# Patient Record
Sex: FEMALE | Race: WHITE | Employment: UNEMPLOYED | URBAN - METROPOLITAN AREA
[De-identification: names, ages, dates, MRNs, and addresses within clinical notes are randomized per-mention and may not be internally consistent; named-entity substitution may affect disease eponyms.]

---

## 2018-04-11 ENCOUNTER — OFFICE VISIT (OUTPATIENT)
Dept: SURGERY | Age: 38
End: 2018-04-11

## 2018-04-11 VITALS
SYSTOLIC BLOOD PRESSURE: 112 MMHG | HEART RATE: 101 BPM | OXYGEN SATURATION: 96 % | DIASTOLIC BLOOD PRESSURE: 73 MMHG | WEIGHT: 250 LBS | HEIGHT: 65 IN | RESPIRATION RATE: 20 BRPM | BODY MASS INDEX: 41.65 KG/M2 | TEMPERATURE: 98.1 F

## 2018-04-11 DIAGNOSIS — D50.9 IRON DEFICIENCY ANEMIA, UNSPECIFIED IRON DEFICIENCY ANEMIA TYPE: ICD-10-CM

## 2018-04-11 DIAGNOSIS — E55.9 VITAMIN D DEFICIENCY: ICD-10-CM

## 2018-04-11 DIAGNOSIS — E53.8 VITAMIN B12 DEFICIENCY: ICD-10-CM

## 2018-04-11 DIAGNOSIS — R63.5 WEIGHT GAIN: ICD-10-CM

## 2018-04-11 DIAGNOSIS — K90.9 INTESTINAL MALABSORPTION, UNSPECIFIED TYPE: ICD-10-CM

## 2018-04-11 DIAGNOSIS — Z98.84 STATUS POST GASTRIC BYPASS FOR OBESITY: ICD-10-CM

## 2018-04-11 DIAGNOSIS — E66.01 MORBID OBESITY (HCC): Primary | ICD-10-CM

## 2018-04-11 RX ORDER — BUPROPION HYDROCHLORIDE 150 MG/1
TABLET, EXTENDED RELEASE ORAL
Refills: 2 | COMMUNITY
Start: 2018-04-05

## 2018-04-11 RX ORDER — TRAZODONE HYDROCHLORIDE 50 MG/1
TABLET ORAL
Refills: 6 | COMMUNITY
Start: 2018-03-24

## 2018-04-11 RX ORDER — ALPRAZOLAM 0.5 MG/1
TABLET ORAL
Refills: 0 | COMMUNITY
Start: 2018-04-04

## 2018-04-11 RX ORDER — CITALOPRAM 40 MG/1
TABLET, FILM COATED ORAL
Refills: 6 | COMMUNITY
Start: 2018-03-26

## 2018-04-11 NOTE — PATIENT INSTRUCTIONS
Upper GI Series: About This Test  What is it? An upper gastrointestinal (GI) series looks at the upper and middle sections of the gastrointestinal tract. The test uses barium contrast material, fluoroscopy, and X-ray. Fluoroscopy is a kind of X-ray. Why is this test done? An upper GI series is done to:  · Find the cause of gastrointestinal symptoms, such as vomiting, burping up food, trouble swallowing, or belly pain. · Find inflamed areas of the intestine. · Find narrow spots (strictures) in the upper intestinal tract or find ulcers, tumors, polyps, or pyloric stenosis. · Find swallowed objects. How can you prepare for the test?  Tell your doctor if:  · You are taking any medicine. · You are allergic to any medicines, barium, or any other X-ray contrast material.  · You are or might be pregnant. This test is not done during pregnancy because of the risk of radiation to the baby (fetus). Your doctor may ask you to do one or all of the following:  · Eat a low-fiber diet for a few days before the test.  · Stop eating for 12 hours before the test.  · Take a laxative to help clean out your intestines the evening before the test.  · Stop taking certain medicines. What happens before the test?  The test is usually done in a clinic or the X-ray department of a hospital.  · You will need to take off your clothes and put on a hospital gown. · Take out any dentures, and take off any jewelry. What happens during the test?  · You will lie on your back on an X-ray table. · You will have an X-ray taken before you drink the barium mix. Then you'll take small swallows repeatedly during the series of X-rays that follow. · The doctor watches the barium pass through your GI tract using fluoroscopy and X-ray pictures. The table is tilted at different positions, and you may change positions to help spread the barium.   What else should you know about the test?  · You may be given a laxative or enema to flush the barium out of your intestines after the test. This prevents constipation. · It's a good idea to drink a lot of fluids for a few days to flush out the barium. · For 1 to 3 days after the test, your stool will look white from the barium. How long does the test take? · The test will take about 30 to 40 minutes. If you are also having a small bowel study, the test will take 2 to 6 hours. In some cases, you may be asked to come back after 24 hours to have more X-rays taken. What happens after the test?  · You will probably be able to go home right away. Results of the test are usually ready in 1 to 3 days. · You can go back to your usual activities right away. You may eat and drink whatever you like, unless your doctor tells you not to. When should you call for help? Watch closely for changes in your health, and be sure to contact your doctor if:  · You aren't able to have a bowel movement in 2 to 3 days after the test.  Follow-up care is a key part of your treatment and safety. Be sure to make and go to all appointments, and call your doctor if you are having problems. It's also a good idea to keep a list of the medicines you take. Ask your doctor when you can expect to have your test results. Where can you learn more? Go to http://ana-mikey.info/. Enter H473 in the search box to learn more about \"Upper GI Series: About This Test.\"  Current as of: October 14, 2016  Content Version: 11.4  © 5965-9428 Healthwise, Incorporated. Care instructions adapted under license by [employer] (which disclaims liability or warranty for this information). If you have questions about a medical condition or this instruction, always ask your healthcare professional. [address] any warranty or liability for your use of this information.
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
## 2018-04-11 NOTE — PROGRESS NOTES
1. Have you been to the ER, urgent care clinic since your last visit? Hospitalized since your last visit? No    2. Have you seen or consulted any other health care providers outside of the [address] [place] [address] since your last visit? Include any pap smears or colon screening.  No

## 2018-04-11 NOTE — MR AVS SNAPSHOT
[address] [address] 
[phone] Patient: Ileana Bey MRN: [id number] DAMIAN:[birth year] Visit Information Date & Time Provider Department Dept. Phone Encounter #  
 4/11/2018  3:00 PM Daisy Austin NP [facility] 22 [phone] [id number] Upcoming Health Maintenance Date Due DTaP/Tdap/Td series (1 - Tdap) 9/9/2000 PAP AKA CERVICAL CYTOLOGY 9/9/2000 Influenza Age 5 to Adult 8/1/2017 Allergies as of 4/11/2018  Review Complete On: 4/11/2018 By: Thor South Severity Noted Reaction Type Reactions Codeine  10/16/2010    Itching  
 facial  
  
Current Immunizations  Never Reviewed No immunizations on file. Not reviewed this visit You Were Diagnosed With   
  
 Codes Comments [facility]    -  Primary ICD-10-CM: E66.01 
ICD-9-CM: 278.01 Status post gastric bypass for obesity     ICD-10-CM: Z98.84 ICD-9-CM: V45.86 Intestinal malabsorption, unspecified type     ICD-10-CM: K90.9 ICD-9-CM: 579.9 Weight gain     ICD-10-CM: R63.5 ICD-9-CM: 783.1 Vitamin B12 deficiency     ICD-10-CM: E53.8 ICD-9-CM: 266.2 Iron deficiency anemia, unspecified iron deficiency anemia type     ICD-10-CM: D50.9 ICD-9-CM: 280.9 Vitamin D deficiency     ICD-10-CM: E55.9 ICD-9-CM: 268.9 BMI 40.0-44.9, adult [facility])     ICD-10-CM: Z68.41 
ICD-9-CM: V85.41 Vitals BP Pulse Temp Resp Height(growth percentile) Weight(growth percentile) 112/73 (BP 1 Location: Left arm, BP Patient Position: Sitting) (!) 101 98.1 °F (36.7 °C) (Oral) 20 5' 4.5\" (1.638 m) 250 lb (113.4 kg) SpO2 BMI Smoking Status 96% 42.25 kg/m2 Never Smoker Vitals History BMI and BSA Data Body Mass Index Body Surface Area  
 42.25 kg/m 2 2.27 m 2 Preferred Pharmacy Pharmacy Name Phone [id number] [address] [phone] Your Updated Medication List  
  
   
This list is accurate as of 4/11/18  3:56 PM.  Always use your most recent med list.  
  
  
  
  
 ALPRAZolam 0.5 mg tablet Commonly known as:  XANAX  
TAKE 1 TABLET BY MOUTH AT BEDTIME. ***MUST BE SEEN*** AMBIEN PO Take  by mouth as needed. ATIVAN PO Take  by mouth as needed. B-12 DOTS 500 mcg tablet Generic drug:  cyanocobalamin Take 500 mcg by mouth daily. buPROPion  mg SR tablet Commonly known as:  WELLBUTRIN SR  
TAKE 1 TABLET BY MOUTH TWICE A DAY  
  
 CALCIUM 500 PO Take 500 mg by mouth two (2) times a day. citalopram 40 mg tablet Commonly known as:  CELEXA  
TAKE 1 TABLET BY MOUTH EVERY DAY  
  
 JAYJAY-IRON PO Take  by mouth.  
  
 multivitamin with iron tablet Take  by mouth.  
  
 polyethylene glycol 17 gram packet Commonly known as:  [place] Take 1 Packet by mouth daily. traZODone 50 mg tablet Commonly known as:  DESYREL  
TAKE 1 TABLET BY MOUTH AT BEDTIME AS NEEDED FOR SLEEP  
  
 VITAMIN D2 PO Take  by mouth. We Performed the Following CBC W/O DIFF [54014 CPT(R)] IRON PROFILE [id number] CPT(R)] METABOLIC PANEL, COMPREHENSIVE [31130 CPT(R)] PTH INTACT [94809 CPT(R)] REFERRAL TO GASTROENTEROLOGY [UVT36 Custom] Comments:  
 Status post gastric bypass, weight gain VITAMIN B12 & FOLATE [30493 CPT(R)] VITAMIN D, 25 HYDROXY [id number] CPT(R)] To-Do List   
 04/11/2018 Imaging:  XR UPPER GI SERIES W KUB Referral Information Referral ID Referred By Referred To  
  
 [id number] Noemi Strong Not Available Visits Status Start Date End Date 1 New Request 4/11/18 4/11/19 If your referral has a status of pending review or denied, additional information will be sent to support the outcome of this decision. Patient Instructions Upper GI Series: About This Test 
What is it? An upper gastrointestinal (GI) series looks at the upper and middle sections of the gastrointestinal tract. The test uses barium contrast material, fluoroscopy, and X-ray. Fluoroscopy is a kind of X-ray. Why is this test done? An upper GI series is done to: · Find the cause of gastrointestinal symptoms, such as vomiting, burping up food, trouble swallowing, or belly pain. · Find inflamed areas of the intestine. · Find narrow spots (strictures) in the upper intestinal tract or find ulcers, tumors, polyps, or pyloric stenosis. · Find swallowed objects. How can you prepare for the test? 
Tell your doctor if: 
· You are taking any medicine. · You are allergic to any medicines, barium, or any other X-ray contrast material. 
· You are or might be pregnant. This test is not done during pregnancy because of the risk of radiation to the baby (fetus). Your doctor may ask you to do one or all of the following: 
· Eat a low-fiber diet for a few days before the test. 
· Stop eating for 12 hours before the test. 
· Take a laxative to help clean out your intestines the evening before the test. 
· Stop taking certain medicines. What happens before the test? 
The test is usually done in a clinic or the X-ray department of a hospital. 
· You will need to take off your clothes and put on a hospital gown. · Take out any dentures, and take off any jewelry. What happens during the test? 
· You will lie on your back on an X-ray table. · You will have an X-ray taken before you drink the barium mix. Then you'll take small swallows repeatedly during the series of X-rays that follow. · The doctor watches the barium pass through your GI tract using fluoroscopy and X-ray pictures. The table is tilted at different positions, and you may change positions to help spread the barium.  
What else should you know about the test? 
 · You may be given a laxative or enema to flush the barium out of your intestines after the test. This prevents constipation. · It's a good idea to drink a lot of fluids for a few days to flush out the barium. · For 1 to 3 days after the test, your stool will look white from the barium. How long does the test take? · The test will take about 30 to 40 minutes. If you are also having a small bowel study, the test will take 2 to 6 hours. In some cases, you may be asked to come back after 24 hours to have more X-rays taken. What happens after the test? 
· You will probably be able to go home right away. Results of the test are usually ready in 1 to 3 days. · You can go back to your usual activities right away. You may eat and drink whatever you like, unless your doctor tells you not to. When should you call for help? Watch closely for changes in your health, and be sure to contact your doctor if: 
· You aren't able to have a bowel movement in 2 to 3 days after the test. 
Follow-up care is a key part of your treatment and safety. Be sure to make and go to all appointments, and call your doctor if you are having problems. It's also a good idea to keep a list of the medicines you take. Ask your doctor when you can expect to have your test results. Where can you learn more? Go to http://ana-mikey.info/. Enter J416 in the search box to learn more about \"Upper GI Series: About This Test.\" Current as of: October 14, 2016 Content Version: 11.4 © 7053-0764 Healthwise, Incorporated. Care instructions adapted under license by [employer] (which disclaims liability or warranty for this information). If you have questions about a medical condition or this instruction, always ask your healthcare professional. [place] [address] any warranty or liability for your use of this information. Introducing [facility] SERVICES! [facility] introduces SynapSense patient portal. Now you can access parts of your medical record, email your doctor's office, and request medication refills online. 1. In your internet browser, go to [email or web address] 2. Click on the First Time User? Click Here link in the Sign In box. You will see the New Member Sign Up page. 3. Enter your SynapSense Access Code exactly as it appears below. You will not need to use this code after youve completed the sign-up process. If you do not sign up before the expiration date, you must request a new code. · SynapSense Access Code: [email or web address] Expires: 7/10/2018  3:54 PM 
 
4. Enter the last four digits of your Social Security Number (xxxx) and Date of Birth (mm/dd/yyyy) as indicated and click Submit. You will be taken to the next sign-up page. 5. Create a SynapSense ID. This will be your SynapSense login ID and cannot be changed, so think of one that is secure and easy to remember. 6. Create a SynapSense password. You can change your password at any time. 7. Enter your Password Reset Question and Answer. This can be used at a later time if you forget your password. 8. Enter your e-mail address. You will receive e-mail notification when new information is available in [address] 9. Click Sign Up. You can now view and download portions of your medical record. 10. Click the Download Summary menu link to download a portable copy of your medical information. If you have questions, please visit the Frequently Asked Questions section of the SynapSense website. Remember, SynapSense is NOT to be used for urgent needs. For medical emergencies, dial 911. Now available from your iPhone and Android! Please provide this summary of care documentation to your next provider. Your primary care clinician is listed as Jame Montes De Oca III. If you have any questions after today's visit, please call [phone].

## 2018-04-12 LAB
25(OH)D3+25(OH)D2 SERPL-MCNC: 30.7 NG/ML (ref 30–100)
ALBUMIN SERPL-MCNC: 4.3 G/DL (ref 3.5–5.5)
ALBUMIN/GLOB SERPL: 1.7 {RATIO} (ref 1.2–2.2)
ALP SERPL-CCNC: 98 IU/L (ref 39–117)
ALT SERPL-CCNC: 16 IU/L (ref 0–32)
AST SERPL-CCNC: 19 IU/L (ref 0–40)
BILIRUB SERPL-MCNC: 0.3 MG/DL (ref 0–1.2)
BUN SERPL-MCNC: 8 MG/DL (ref 6–20)
BUN/CREAT SERPL: 11 (ref 9–23)
CALCIUM SERPL-MCNC: 8.9 MG/DL (ref 8.7–10.2)
CHLORIDE SERPL-SCNC: 104 MMOL/L (ref 96–106)
CO2 SERPL-SCNC: 22 MMOL/L (ref 18–29)
CREAT SERPL-MCNC: 0.71 MG/DL (ref 0.57–1)
ERYTHROCYTE [DISTWIDTH] IN BLOOD BY AUTOMATED COUNT: 14.9 % (ref 12.3–15.4)
FOLATE SERPL-MCNC: 3.9 NG/ML
GFR SERPLBLD CREATININE-BSD FMLA CKD-EPI: 108 ML/MIN/1.73
GFR SERPLBLD CREATININE-BSD FMLA CKD-EPI: 125 ML/MIN/1.73
GLOBULIN SER CALC-MCNC: 2.6 G/DL (ref 1.5–4.5)
GLUCOSE SERPL-MCNC: 81 MG/DL (ref 65–99)
HCT VFR BLD AUTO: 38 % (ref 34–46.6)
HGB BLD-MCNC: 12.5 G/DL (ref 11.1–15.9)
IRON SATN MFR SERPL: 13 % (ref 15–55)
IRON SERPL-MCNC: 48 UG/DL (ref 27–159)
MCH RBC QN AUTO: 28 PG (ref 26.6–33)
MCHC RBC AUTO-ENTMCNC: 32.9 G/DL (ref 31.5–35.7)
MCV RBC AUTO: 85 FL (ref 79–97)
PLATELET # BLD AUTO: 304 X10E3/UL (ref 150–379)
POTASSIUM SERPL-SCNC: 4.5 MMOL/L (ref 3.5–5.2)
PROT SERPL-MCNC: 6.9 G/DL (ref 6–8.5)
PTH-INTACT SERPL-MCNC: 22 PG/ML (ref 15–65)
RBC # BLD AUTO: 4.47 X10E6/UL (ref 3.77–5.28)
SODIUM SERPL-SCNC: 143 MMOL/L (ref 134–144)
TIBC SERPL-MCNC: 379 UG/DL (ref 250–450)
UIBC SERPL-MCNC: 331 UG/DL (ref 131–425)
VIT B12 SERPL-MCNC: 463 PG/ML (ref 232–1245)
WBC # BLD AUTO: 8.1 X10E3/UL (ref 3.4–10.8)

## 2018-08-27 ENCOUNTER — HOSPITAL ENCOUNTER (OUTPATIENT)
Dept: GENERAL RADIOLOGY | Age: 38
Discharge: HOME | End: 2018-08-27
Attending: NURSE PRACTITIONER
Payer: MEDICAID

## 2018-08-27 DIAGNOSIS — D50.9 IRON DEFICIENCY ANEMIA, UNSPECIFIED IRON DEFICIENCY ANEMIA TYPE: ICD-10-CM

## 2018-08-27 DIAGNOSIS — E66.01 MORBID OBESITY (HCC): ICD-10-CM

## 2018-08-27 DIAGNOSIS — E55.9 VITAMIN D DEFICIENCY: ICD-10-CM

## 2018-08-27 DIAGNOSIS — R63.5 WEIGHT GAIN: ICD-10-CM

## 2018-08-27 DIAGNOSIS — K90.9 INTESTINAL MALABSORPTION, UNSPECIFIED TYPE: ICD-10-CM

## 2018-08-27 DIAGNOSIS — E53.8 VITAMIN B12 DEFICIENCY: ICD-10-CM

## 2018-08-27 DIAGNOSIS — Z98.84 STATUS POST GASTRIC BYPASS FOR OBESITY: ICD-10-CM

## 2018-08-27 PROCEDURE — 74241 XR UPPER GI SERIES W KUB: CPT

## 2018-08-28 ENCOUNTER — TELEPHONE (OUTPATIENT)
Dept: SURGERY | Age: 38
End: 2018-08-28

## 2018-08-28 NOTE — TELEPHONE ENCOUNTER
Telephone call with patient. Patient was seen in April 2018 for weight gain. Patient wanting results of Upper GI. Patient desire to have revision of gastric bypass. Upper GI: The pouch measures 4.5 x 9.5 cm. Patient was only able to ingest 4 ounces. There was delayed passage of a barium tablet through the gastrojejunal anastomosis. Advised patient will schedule for endoscopy for further evaluation. Patient to also have appointment with surgeon. Advised if any questions to call the office.

## 2018-09-05 ENCOUNTER — TELEPHONE (OUTPATIENT)
Dept: SURGERY | Age: 38
End: 2018-09-05

## 2018-09-06 ENCOUNTER — TELEPHONE (OUTPATIENT)
Dept: SURGERY | Age: 38
End: 2018-09-06

## 2018-09-06 NOTE — TELEPHONE ENCOUNTER
Telephone call with patient. Patient requesting for documentation to be sent to endoscopy group at [facility] for to have endoscopy scheduled. Stated the GI group that we previous sent information to didn't take her insurance. Advised patient will send office note and demographic information. Information faxed. Advised if any questions or concerns to call the office.

## 2018-09-18 ENCOUNTER — TELEPHONE (OUTPATIENT)
Dept: SURGERY | Age: 38
End: 2018-09-18

## 2018-11-14 ENCOUNTER — ANESTHESIA EVENT (OUTPATIENT)
Dept: ENDOSCOPY | Age: 38
End: 2018-11-14
Payer: SELF-PAY

## 2018-11-14 NOTE — ANESTHESIA PREPROCEDURE EVALUATION
Anesthetic History No history of anesthetic complications Review of Systems / Medical History Patient summary reviewed, nursing notes reviewed and pertinent labs reviewed Pulmonary Sleep apnea Neuro/Psych Headaches and psychiatric history Cardiovascular Within defined limits GI/Hepatic/Renal 
  
GERD Endo/Other Obesity Other Findings Physical Exam 
 
Airway Mallampati: II 
TM Distance: > 6 cm Neck ROM: normal range of motion Mouth opening: Normal 
 
 Cardiovascular Regular rate and rhythm,  S1 and S2 normal,  no murmur, click, rub, or gallop Dental 
No notable dental hx Pulmonary Breath sounds clear to auscultation Abdominal 
GI exam deferred Other Findings Anesthetic Plan ASA: 3 Anesthesia type: MAC Induction: Intravenous Anesthetic plan and risks discussed with: Patient

## 2018-11-15 ENCOUNTER — ANESTHESIA (OUTPATIENT)
Dept: ENDOSCOPY | Age: 38
End: 2018-11-15
Payer: SELF-PAY

## 2018-11-15 ENCOUNTER — HOSPITAL ENCOUNTER (OUTPATIENT)
Age: 38
Setting detail: OUTPATIENT SURGERY
Discharge: HOME | End: 2018-11-15
Attending: SPECIALIST | Admitting: SPECIALIST
Payer: SELF-PAY

## 2018-11-15 VITALS
RESPIRATION RATE: 12 BRPM | TEMPERATURE: 97.7 F | SYSTOLIC BLOOD PRESSURE: 130 MMHG | HEIGHT: 64 IN | DIASTOLIC BLOOD PRESSURE: 76 MMHG | WEIGHT: 237 LBS | BODY MASS INDEX: 40.46 KG/M2 | OXYGEN SATURATION: 100 % | HEART RATE: 54 BPM

## 2018-11-15 LAB — HCG UR QL: NEGATIVE

## 2018-11-15 PROCEDURE — 81025 URINE PREGNANCY TEST: CPT

## 2018-11-15 PROCEDURE — 88305 TISSUE EXAM BY PATHOLOGIST: CPT

## 2018-11-15 PROCEDURE — 76060000031 HC ANESTHESIA FIRST 0.5 HR: Performed by: SPECIALIST

## 2018-11-15 PROCEDURE — 77030009426 HC FCPS BIOP ENDOSC BSC -B: Performed by: SPECIALIST

## 2018-11-15 PROCEDURE — 74011250636 HC RX REV CODE- 250/636

## 2018-11-15 PROCEDURE — 76040000019: Performed by: SPECIALIST

## 2018-11-15 RX ORDER — SODIUM CHLORIDE 9 MG/ML
50 INJECTION, SOLUTION INTRAVENOUS CONTINUOUS
Status: DISCONTINUED | OUTPATIENT
Start: 2018-11-15 | End: 2018-11-15 | Stop reason: HOSPADM

## 2018-11-15 RX ORDER — MIDAZOLAM HYDROCHLORIDE 1 MG/ML
.25-1 INJECTION, SOLUTION INTRAMUSCULAR; INTRAVENOUS
Status: DISCONTINUED | OUTPATIENT
Start: 2018-11-15 | End: 2018-11-15 | Stop reason: HOSPADM

## 2018-11-15 RX ORDER — DEXTROMETHORPHAN/PSEUDOEPHED 2.5-7.5/.8
1.2 DROPS ORAL
Status: DISCONTINUED | OUTPATIENT
Start: 2018-11-15 | End: 2018-11-15 | Stop reason: HOSPADM

## 2018-11-15 RX ORDER — NALOXONE HYDROCHLORIDE 0.4 MG/ML
0.4 INJECTION, SOLUTION INTRAMUSCULAR; INTRAVENOUS; SUBCUTANEOUS
Status: DISCONTINUED | OUTPATIENT
Start: 2018-11-15 | End: 2018-11-15 | Stop reason: HOSPADM

## 2018-11-15 RX ORDER — LIDOCAINE HYDROCHLORIDE 20 MG/ML
INJECTION, SOLUTION EPIDURAL; INFILTRATION; INTRACAUDAL; PERINEURAL AS NEEDED
Status: DISCONTINUED | OUTPATIENT
Start: 2018-11-15 | End: 2018-11-15 | Stop reason: HOSPADM

## 2018-11-15 RX ORDER — FLUMAZENIL 0.1 MG/ML
0.2 INJECTION INTRAVENOUS
Status: DISCONTINUED | OUTPATIENT
Start: 2018-11-15 | End: 2018-11-15 | Stop reason: HOSPADM

## 2018-11-15 RX ORDER — SODIUM CHLORIDE 0.9 % (FLUSH) 0.9 %
5-10 SYRINGE (ML) INJECTION AS NEEDED
Status: DISCONTINUED | OUTPATIENT
Start: 2018-11-15 | End: 2018-11-15 | Stop reason: HOSPADM

## 2018-11-15 RX ORDER — SODIUM CHLORIDE 9 MG/ML
INJECTION, SOLUTION INTRAVENOUS
Status: DISCONTINUED | OUTPATIENT
Start: 2018-11-15 | End: 2018-11-15 | Stop reason: HOSPADM

## 2018-11-15 RX ORDER — FENTANYL CITRATE 50 UG/ML
200 INJECTION, SOLUTION INTRAMUSCULAR; INTRAVENOUS
Status: DISCONTINUED | OUTPATIENT
Start: 2018-11-15 | End: 2018-11-15 | Stop reason: HOSPADM

## 2018-11-15 RX ORDER — EPINEPHRINE 0.1 MG/ML
1 INJECTION INTRACARDIAC; INTRAVENOUS
Status: DISCONTINUED | OUTPATIENT
Start: 2018-11-15 | End: 2018-11-15 | Stop reason: HOSPADM

## 2018-11-15 RX ORDER — PROPOFOL 10 MG/ML
INJECTION, EMULSION INTRAVENOUS AS NEEDED
Status: DISCONTINUED | OUTPATIENT
Start: 2018-11-15 | End: 2018-11-15 | Stop reason: HOSPADM

## 2018-11-15 RX ORDER — SODIUM CHLORIDE 0.9 % (FLUSH) 0.9 %
5-10 SYRINGE (ML) INJECTION EVERY 8 HOURS
Status: DISCONTINUED | OUTPATIENT
Start: 2018-11-15 | End: 2018-11-15 | Stop reason: HOSPADM

## 2018-11-15 RX ORDER — ATROPINE SULFATE 0.1 MG/ML
0.5 INJECTION INTRAVENOUS
Status: DISCONTINUED | OUTPATIENT
Start: 2018-11-15 | End: 2018-11-15 | Stop reason: HOSPADM

## 2018-11-15 RX ADMIN — PROPOFOL 50 MG: 10 INJECTION, EMULSION INTRAVENOUS at 17:30

## 2018-11-15 RX ADMIN — PROPOFOL 50 MG: 10 INJECTION, EMULSION INTRAVENOUS at 17:33

## 2018-11-15 RX ADMIN — PROPOFOL 70 MG: 10 INJECTION, EMULSION INTRAVENOUS at 17:38

## 2018-11-15 RX ADMIN — PROPOFOL 50 MG: 10 INJECTION, EMULSION INTRAVENOUS at 17:39

## 2018-11-15 RX ADMIN — PROPOFOL 50 MG: 10 INJECTION, EMULSION INTRAVENOUS at 17:31

## 2018-11-15 RX ADMIN — SODIUM CHLORIDE: 9 INJECTION, SOLUTION INTRAVENOUS at 17:00

## 2018-11-15 RX ADMIN — PROPOFOL 50 MG: 10 INJECTION, EMULSION INTRAVENOUS at 17:29

## 2018-11-15 RX ADMIN — PROPOFOL 30 MG: 10 INJECTION, EMULSION INTRAVENOUS at 17:34

## 2018-11-15 RX ADMIN — PROPOFOL 50 MG: 10 INJECTION, EMULSION INTRAVENOUS at 17:32

## 2018-11-15 RX ADMIN — LIDOCAINE HYDROCHLORIDE 40 MG: 20 INJECTION, SOLUTION EPIDURAL; INFILTRATION; INTRACAUDAL; PERINEURAL at 17:29

## 2018-11-15 NOTE — ROUTINE PROCESS
Judie Enter 1980 
[id number] Situation: 
[facility]: Procedure(s): ESOPHAGOGASTRODUODENOSCOPY (EGD) ESOPHAGOGASTRODUODENAL (EGD) BIOPSY Background: 
 
Preoperative diagnosis: DYSPHAGIA, ABDOMINAL PAIN Postoperative diagnosis: GE Junction Polyp Esophagitis :  Dr. Lázaro Beltrán Assistant(s): Endoscopy Technician-1: Martine Washington Endoscopy RN-1: Gary Lara RN Specimens:  
ID Type Source Tests Collected by Time Destination 1 : [place] Polyp bx Preservative Esophagus, Distal  April Philippe MD 11/15/2018 1738 Pathology H. Pylori  no Assessment: 
Intra-procedure medications Anesthesia gave intra-procedure sedation and medications, see anesthesia flow sheet yes Intravenous fluids: NS@ [place] Vital signs stable Abdominal assessment: round and soft Recommendation: 
Discharge patient per MD order. Family or Friend Daughter Permission to share finding with family or friend yes

## 2018-11-15 NOTE — DISCHARGE INSTRUCTIONS
Anna Jewell  [id number]  1980    EGD DISCHARGE INSTRUCTIONS  Discomfort:  Sore throat- throat lozenges or warm salt water gargle  redness at IV site- apply warm compress to area; if redness or soreness persist- contact your physician  Gaseous discomfort- walking, belching will help relieve any discomfort  You may not operate a vehicle for 12 hours  You may not engage in an occupation involving machinery or appliances for rest of today. You may not drink alcoholic beverages for at least 12 hours  Avoid making any critical decisions for at least 24 hour  DIET  You may resume your regular diet - however -  remember your colon is empty and a heavy meal will produce gas. Avoid these foods:  vegetables, fried / greasy foods, carbonated drinks  MEDICATIONS   Regarding Aspirin or Nonsteroidal medications specifically, please see below. ACTIVITY  You may resume your normal daily activities. Spend the remainder of the day resting -  avoid any strenuous activity. CALL M.D. ANY SIGN OF   Increasing pain, nausea, vomiting  Abdominal distension (swelling)  New increased bleeding (oral or rectal)  Fever (chills)  Pain in chest area  Bloody discharge from nose or mouth  Shortness of breath    You may not  take any Advil, Aspirin, Ibuprofen, Motrin, Aleve, or Goodys for 10 days, ONLY  Tylenol as needed for pain. Take Pantoprazole as prescribed.     Follow-up Instructions:   Call Dr. Rebeca Abbott  Results of procedure / biopsy in 10 days  Telephone #  [phone]        DISCHARGE SUMMARY from Nurse    The following personal items collected during your admission are returned to you:   Dental Appliance: Dental Appliances: None  Vision: Visual Aid: None  Hearing Aid:    Jewelry:    Clothing:    Other Valuables:    Valuables sent to safe:

## 2018-11-15 NOTE — PROCEDURES
[address]  [facility], [address]                 NAME:  Remy Dietrich   :   1980   MRN:   [id number]     Date/Time:  11/15/2018 5:49 PM    Esophagogastroduodenoscopy (EGD) Procedure Note    :  Keary Sicard, MD    Referring Provider:  Flora Hurtado MD    Anethesia/Sedation:  MAC anesthesia Propofol    Preoperative diagnosis: DYSPHAGIA, ABDOMINAL PAIN    Postoperative diagnosis: GE Junction Polyp  Esophagitis    Procedure Details     After infom consent was obtained for the procedure, with all risks and benefits of procedure explained the patient was taken to the endoscopy suite and placed in the left lateral decubitus position. Following sequential administration of sedation as per above, the [id number] gastroscope was inserted into the mouth and advanced under direct vision to proximal jejunum. A careful inspection was made as the gastroscope was withdrawn, including a retroflexed view of the proximal stomach; findings and interventions are described below. Findings:  Esophagus:Grade 2 esophagitis  Stomach:5 mm polyp at GE junction biopsied  Duodenum/jejunum:norml jejunal mucosa      Therapies:  none    Specimens: GE junction polyp biopsied           EBL: None    Complications:   None; patient tolerated the procedure well. Impression:    See Postoperative diagnosis above    Recommendations:  -Acid suppression with a proton pump inhibitor. , -Await pathology.     Discharge disposition:  Home in the company of  when able to ambulate    Keary Sicard, MD

## 2018-11-15 NOTE — H&P
Pre-endoscopy H and P The patient was seen and examined in the endoscopy suite. The airway was assessed and docuemented. The problem list and medications were reviewed. Patient Active Problem List  
Diagnosis Code  Morbid obesity ([facility]) E66.01  
 Status post gastric bypass for obesity Z98.84  
 Migraine headache G43.909  Varicose veins I83.90  Depression F32.9  Allergic rhinitis J30.9  Psychosocial circumstance Social History Socioeconomic History  Marital status: OTHER Spouse name: Not on file  Number of children: Not on file  Years of education: Not on file  Highest education level: Not on file Social Needs  Financial resource strain: Not on file  Food insecurity - worry: Not on file  Food insecurity - inability: Not on file  Transportation needs - medical: Not on file  Transportation needs - non-medical: Not on file Occupational History  Not on file Tobacco Use  Smoking status: Never Smoker  Smokeless tobacco: Never Used Substance and Sexual Activity  Alcohol use: Yes  Drug use: Not on file  Sexual activity: Not on file Other Topics Concern  Not on file Social History Narrative  Not on file Past Medical History:  
Diagnosis Date  Allergic rhinitis 10/16/2010  Chronic joint pain 10/16/2010  Chronic low back pain 10/16/2010  Depression 10/16/2010  
 Heartburn 10/16/2010  Ill-defined condition 2018  
 colitis  Migraine headache 10/16/2010  Morbid obesity ([address] 10/16/2010  Nocturia 10/16/2010  Obstructive sleep apnea 10/16/2010  Psychosocial circumstance 10/16/2010  Reflux 10/16/2010  
 SOB (shortness of breath) on exertion 10/16/2010  Status post gastric bypass for obesity 10/16/2010  Urinary incontinence 10/16/2010  Varicose veins 10/16/2010 Prior to Admission Medications Prescriptions Last Dose Informant Patient Reported? Taking? ALPRAZolam (XANAX) 0.5 mg tablet 11/14/2018 at Unknown time  Yes Yes Sig: TAKE 1 TABLET BY MOUTH AT BEDTIME. MUST BE SEEN  
CALCIUM CARBONATE (CALCIUM 500 PO) 11/8/2018 at Unknown time  Yes Yes Sig: Take 500 mg by mouth two (2) times a day. ERGOCALCIFEROL, VITAMIN D2, (VITAMIN D PO) Unknown at Unknown time  Yes No  
Sig: Take  by mouth. FERROUS SULFATE (JAYJAY-IRON PO) 10/15/2018 at Unknown time  Yes Yes Sig: Take  by mouth. LORAZEPAM (ATIVAN PO) Unknown at Unknown time  Yes No  
Sig: Take  by mouth as needed. ZOLPIDEM TARTRATE (AMBIEN PO) 11/14/2018 at Unknown time  Yes Yes Sig: Take  by mouth as needed. buPROPion SR (WELLBUTRIN SR) 150 mg SR tablet 11/15/2018 at Unknown time  Yes Yes Sig: TAKE 1 TABLET BY MOUTH TWICE A DAY  
citalopram (CELEXA) 40 mg tablet 11/15/2018 at Unknown time  Yes Yes Sig: TAKE 1 TABLET BY MOUTH EVERY DAY  
cyanocobalamin (B-12 DOTS) 500 mcg tablet 10/15/2018 at Unknown time  Yes Yes Sig: Take 500 mcg by mouth daily. multivitamins with iron Tab 11/14/2018 at Unknown time  Yes Yes Sig: Take  by mouth.  
polyethylene glycol (MIRALAX) 17 gram packet 10/15/2018 at Unknown time  No Yes Sig: Take 1 Packet by mouth daily. traZODone (DESYREL) 50 mg tablet 11/13/2018  Yes No  
Sig: TAKE 1 TABLET BY MOUTH AT BEDTIME AS NEEDED FOR SLEEP Facility-Administered Medications: None Chief complaint, history of present illness, and review of systems and Past medical History are positive for: abdominal pain and dysphagia. The heart, lungs and mental status were satisfactory for the administration of sedation and for the procedure. I discussed with the patient the objectives, risks, consequences and alternatives to the procedure. Plan: Endoscopic procedure with sedation Fer Francis MD  
11/15/2018 
5:27 PM

## 2018-11-15 NOTE — ANESTHESIA POSTPROCEDURE EVALUATION
Procedure(s): ESOPHAGOGASTRODUODENOSCOPY (EGD) ESOPHAGOGASTRODUODENAL (EGD) BIOPSY. Anesthesia Post Evaluation Comments: Post-Anesthesia Evaluation and Assessment I have evaluated the patient and they are ready for PACU discharge. Patient: Ana Alfaro MRN: [id number]  SSN: [id number] YOB: 1980  Age: 43 y.o. Sex: female Cardiovascular Function/Vital Signs /52   Pulse (!) 57   Temp 36.5 °C (97.7 °F)   Resp 20   Ht 5' 4\" (1.626 m)   Wt 107.5 kg (237 lb)   SpO2 99%   Breastfeeding? No   BMI 40.68 kg/m² Patient is status post MAC anesthesia for Procedure(s): ESOPHAGOGASTRODUODENOSCOPY (EGD) ESOPHAGOGASTRODUODENAL (EGD) BIOPSY. Nausea/Vomiting: None Postoperative hydration reviewed and adequate. Pain: 
Pain Scale 1: Numeric (0 - 10) (11/15/18 1550) Pain Intensity 1: 0 (11/15/18 1550) Managed Neurological Status: At baseline Mental Status, Level of Consciousness: Alert and  oriented to person, place, and time Pulmonary Status:  
O2 Device: Nasal cannula (11/15/18 1742) Adequate oxygenation and airway patent Complications related to anesthesia: None Post-anesthesia assessment completed. No concerns Signed By: Tamica Smith MD  
 November 15, 2018 Visit Vitals /52 Pulse (!) 57 Temp 36.5 °C (97.7 °F) Resp 20 Ht 5' 4\" (1.626 m) Wt 107.5 kg (237 lb) SpO2 99% Breastfeeding? No  
BMI 40.68 kg/m²

## (undated) DEVICE — NEONATAL-ADULT SPO2 SENSOR: Brand: NELLCOR

## (undated) DEVICE — SET ADMIN 16ML TBNG L100IN 2 Y INJ SITE IV PIGGY BK DISP

## (undated) DEVICE — 1200 GUARD II KIT W/5MM TUBE W/O VAC TUBE: Brand: GUARDIAN

## (undated) DEVICE — BITE BLK ENDOSCP AD 54FR GRN POLYETH ENDOSCP W STRP SLD

## (undated) DEVICE — CANN NASAL O2 CAPNOGRAPHY AD -- FILTERLINE

## (undated) DEVICE — FORCEPS BX L240CM JAW DIA2.8MM L CAP W/ NDL MIC MESH TOOTH

## (undated) DEVICE — CATH IV AUTOGRD BC BLU 22GA 25 -- INSYTE

## (undated) DEVICE — NEEDLE HYPO 18GA L1.5IN PNK S STL HUB POLYPR SHLD REG BVL

## (undated) DEVICE — KENDALL RADIOLUCENT FOAM MONITORING ELECTRODE -RECTANGULAR SHAPE: Brand: KENDALL

## (undated) DEVICE — BAG BELONG PT PERS CLEAR HANDL

## (undated) DEVICE — BW-412T DISP COMBO CLEANING BRUSH: Brand: SINGLE USE COMBINATION CLEANING BRUSH

## (undated) DEVICE — SOLIDIFIER FLUID 3000 CC ABSORB

## (undated) DEVICE — CONTAINER SPEC 20 ML LID NEUT BUFF FORMALIN 10 % POLYPR STS

## (undated) DEVICE — SYRINGE MED 20ML STD CLR PLAS LUERLOCK TIP N CTRL DISP

## (undated) DEVICE — Z DISCONTINUED NO SUB IDED SET EXTN W/ 4 W STPCOCK M SPIN LOK 36IN

## (undated) DEVICE — KIT IV STRT W CHLORAPREP PD 1ML

## (undated) DEVICE — Device

## (undated) DEVICE — ENDO CARRY-ON PROCEDURE KIT INCLUDES ENZYMATIC SPONGE, GAUZE, BIOHAZARD LABEL, TRAY, LUBRICANT, DIRTY SCOPE LABEL, WATER LABEL, TRAY, DRAWSTRING PAD, AND DEFENDO 4-PIECE KIT.: Brand: ENDO CARRY-ON PROCEDURE KIT

## (undated) DEVICE — BAG SPEC BIOHZD LF 2MIL 6X10IN -- CONVERT TO ITEM 357326